# Patient Record
Sex: FEMALE | Race: WHITE | NOT HISPANIC OR LATINO | URBAN - METROPOLITAN AREA
[De-identification: names, ages, dates, MRNs, and addresses within clinical notes are randomized per-mention and may not be internally consistent; named-entity substitution may affect disease eponyms.]

---

## 2024-07-24 ENCOUNTER — OFFICE VISIT (OUTPATIENT)
Dept: DERMATOLOGY | Facility: CLINIC | Age: 69
End: 2024-07-24
Payer: MEDICARE

## 2024-07-24 DIAGNOSIS — B07.0 PLANTAR WART OF LEFT FOOT: Primary | ICD-10-CM

## 2024-07-24 PROCEDURE — 1159F MED LIST DOCD IN RCRD: CPT | Mod: CPTII,S$GLB,, | Performed by: DERMATOLOGY

## 2024-07-24 PROCEDURE — 99203 OFFICE O/P NEW LOW 30 MIN: CPT | Mod: S$GLB,,, | Performed by: DERMATOLOGY

## 2024-07-24 PROCEDURE — 1160F RVW MEDS BY RX/DR IN RCRD: CPT | Mod: CPTII,S$GLB,, | Performed by: DERMATOLOGY

## 2024-07-24 NOTE — PROGRESS NOTES
Subjective:      Patient ID:  Alta Mcmanus is a 69 y.o. female who presents for No chief complaint on file.    HPI  Pt presents today with plantar wart. Pt states lesion have been present for months with no issue. Now when she ambulates the lesions are very painful as she transfers weight across her foot. Has tried no OTC therapies. States she has no warts elsewhere on her body.  Review of Systems    Objective:   Physical Exam   Skin:              Diagram Legend     Erythematous scaling macule/papule c/w actinic keratosis       Vascular papule c/w angioma      Pigmented verrucoid papule/plaque c/w seborrheic keratosis      Yellow umbilicated papule c/w sebaceous hyperplasia      Irregularly shaped tan macule c/w lentigo     1-2 mm smooth white papules consistent with Milia      Movable subcutaneous cyst with punctum c/w epidermal inclusion cyst      Subcutaneous movable cyst c/w pilar cyst      Firm pink to brown papule c/w dermatofibroma      Pedunculated fleshy papule(s) c/w skin tag(s)      Evenly pigmented macule c/w junctional nevus     Mildly variegated pigmented, slightly irregular-bordered macule c/w mildly atypical nevus      Flesh colored to evenly pigmented papule c/w intradermal nevus       Pink pearly papule/plaque c/w basal cell carcinoma      Erythematous hyperkeratotic cursted plaque c/w SCC      Surgical scar with no sign of skin cancer recurrence      Open and closed comedones      Inflammatory papules and pustules      Verrucoid papule consistent consistent with wart     Erythematous eczematous patches and plaques     Dystrophic onycholytic nail with subungual debris c/w onychomycosis     Umbilicated papule    Erythematous-base heme-crusted tan verrucoid plaque consistent with inflamed seborrheic keratosis     Erythematous Silvery Scaling Plaque c/w Psoriasis     See annotation      Assessment / Plan:           Plantar wart of left foot  - OTC mediplast pads by [facility]ad  - apply and leave on  for 1-2 days  - secure with duct tape.  - pt warned that warts will appear irritated. Advised to continue with tx             F/u 2     I have seen the patient, reviewed the Resident's progress note. I have personally interviewed and examined the patient at bedside and agree with the findings.     Confirm pt allergies at next visit.

## 2024-09-23 ENCOUNTER — TELEPHONE (OUTPATIENT)
Dept: DERMATOLOGY | Facility: CLINIC | Age: 69
End: 2024-09-23
Payer: MEDICARE

## 2024-09-24 ENCOUNTER — OFFICE VISIT (OUTPATIENT)
Dept: DERMATOLOGY | Facility: CLINIC | Age: 69
End: 2024-09-24
Payer: MEDICARE

## 2024-09-24 DIAGNOSIS — B07.0 PLANTAR WART OF LEFT FOOT: Primary | ICD-10-CM

## 2024-09-24 PROCEDURE — 17110 DESTRUCTION B9 LES UP TO 14: CPT | Mod: S$GLB,,, | Performed by: DERMATOLOGY

## 2024-09-24 PROCEDURE — 1159F MED LIST DOCD IN RCRD: CPT | Mod: CPTII,S$GLB,, | Performed by: DERMATOLOGY

## 2024-09-24 PROCEDURE — 99213 OFFICE O/P EST LOW 20 MIN: CPT | Mod: 25,GC,S$GLB, | Performed by: DERMATOLOGY

## 2024-09-24 PROCEDURE — 1160F RVW MEDS BY RX/DR IN RCRD: CPT | Mod: CPTII,S$GLB,, | Performed by: DERMATOLOGY

## 2024-09-24 PROCEDURE — 99999 PR PBB SHADOW E&M-EST. PATIENT-LVL I: CPT | Mod: PBBFAC,,, | Performed by: DERMATOLOGY

## 2024-09-24 NOTE — PROGRESS NOTES
Subjective:      Patient ID:  Alta Mcmanus is a 69 y.o. female who presents for   Chief Complaint   Patient presents with    Warts     HPI  69 y.o. F here for follow-up of two plantar warts of the left foot. LOV 7/24/2024, recommended OTC mediplast pads by [facility]ad (contain 40% salicylic acid). Today, patient reports she has been using the wart pads every day for the past 2 months for the wart on the metatarsal pad of her foot. She has not been consistently treating the one on her heel. The one on her metatarsal pad is less tender and have turned more white. The one on her heel has not changed.     Review of Systems   Skin:  Negative for itching and rash.       Objective:   Physical Exam   Constitutional: She appears well-developed and well-nourished. No distress.   Neurological: She is alert and oriented to person, place, and time. She is not disoriented.   Psychiatric: She has a normal mood and affect.   Skin:   Areas Examined (abnormalities noted in diagram):   RLE Inspected  LLE Inspection Performed           Diagram Legend     Erythematous scaling macule/papule c/w actinic keratosis       Vascular papule c/w angioma      Pigmented verrucoid papule/plaque c/w seborrheic keratosis      Yellow umbilicated papule c/w sebaceous hyperplasia      Irregularly shaped tan macule c/w lentigo     1-2 mm smooth white papules consistent with Milia      Movable subcutaneous cyst with punctum c/w epidermal inclusion cyst      Subcutaneous movable cyst c/w pilar cyst      Firm pink to brown papule c/w dermatofibroma      Pedunculated fleshy papule(s) c/w skin tag(s)      Evenly pigmented macule c/w junctional nevus     Mildly variegated pigmented, slightly irregular-bordered macule c/w mildly atypical nevus      Flesh colored to evenly pigmented papule c/w intradermal nevus       Pink pearly papule/plaque c/w basal cell carcinoma      Erythematous hyperkeratotic cursted plaque c/w SCC      Surgical scar with no sign of  skin cancer recurrence      Open and closed comedones      Inflammatory papules and pustules      Verrucoid papule consistent consistent with wart     Erythematous eczematous patches and plaques     Dystrophic onycholytic nail with subungual debris c/w onychomycosis     Umbilicated papule    Erythematous-base heme-crusted tan verrucoid plaque consistent with inflamed seborrheic keratosis     Erythematous Silvery Scaling Plaque c/w Psoriasis     See annotation        Assessment / Plan:        Plantar wart of left foot    Expected modest improvement of plantar wart on left foot metatarsal pad s/p 2 months of OTC wart pads containing 40% salicylic acid. Not as tender as prior. Patient with remaining plantar warts on left heel and new wart on left 4th toe pad.   Plan:  - paring following by cryodestruction performed to the 3 warts as below;   - counseled patient to continue OTC wart pads to all 3 warts  - follow-up with Dr. Browning for FBSE on 11/11 already scheduled    Cryosurgery procedure note:    Verbal consent from the patient is obtained including, but not limited to, risk of hypopigmentation/hyperpigmentation, scar, recurrence of lesion. Liquid nitrogen cryosurgery is applied to 3 verruca with prior paring, as detailed in the physical exam, to produce a freeze injury. 3 consecutive freeze thaw cycles are applied to each verruca. The patient is aware that blisters (possibly blood blisters) may form.         Esther Fuller MD  [facility]atology PGY-III

## 2024-09-24 NOTE — PROGRESS NOTES
I have seen the patient, reviewed the Resident's progress note. I have personally interviewed and examined the patient at bedside and agree with the findings.     Connie Browning MD  [facility]

## 2024-11-12 ENCOUNTER — TELEPHONE (OUTPATIENT)
Dept: DERMATOLOGY | Facility: CLINIC | Age: 69
End: 2024-11-12
Payer: MEDICARE

## 2024-11-13 ENCOUNTER — OFFICE VISIT (OUTPATIENT)
Dept: DERMATOLOGY | Facility: CLINIC | Age: 69
End: 2024-11-13
Payer: MEDICARE

## 2024-11-13 DIAGNOSIS — Z12.83 SKIN CANCER SCREENING: Primary | ICD-10-CM

## 2024-11-13 DIAGNOSIS — L57.0 ACTINIC KERATOSIS: ICD-10-CM

## 2024-11-13 DIAGNOSIS — D18.01 CHERRY ANGIOMA: ICD-10-CM

## 2024-11-13 DIAGNOSIS — B07.0 PLANTAR WART OF LEFT FOOT: ICD-10-CM

## 2024-11-13 DIAGNOSIS — L82.1 SEBORRHEIC KERATOSIS: ICD-10-CM

## 2024-11-13 PROCEDURE — 17000 DESTRUCT PREMALG LESION: CPT | Mod: XS,S$GLB,, | Performed by: DERMATOLOGY

## 2024-11-13 PROCEDURE — 17110 DESTRUCTION B9 LES UP TO 14: CPT | Mod: S$GLB,,, | Performed by: DERMATOLOGY

## 2024-11-13 PROCEDURE — 17003 DESTRUCT PREMALG LES 2-14: CPT | Mod: XS,S$GLB,, | Performed by: DERMATOLOGY

## 2024-11-13 PROCEDURE — 99999 PR PBB SHADOW E&M-EST. PATIENT-LVL I: CPT | Mod: PBBFAC,,, | Performed by: DERMATOLOGY

## 2024-11-13 PROCEDURE — 1160F RVW MEDS BY RX/DR IN RCRD: CPT | Mod: CPTII,S$GLB,, | Performed by: DERMATOLOGY

## 2024-11-13 PROCEDURE — 99213 OFFICE O/P EST LOW 20 MIN: CPT | Mod: 25,S$GLB,, | Performed by: DERMATOLOGY

## 2024-11-13 PROCEDURE — 1101F PT FALLS ASSESS-DOCD LE1/YR: CPT | Mod: CPTII,S$GLB,, | Performed by: DERMATOLOGY

## 2024-11-13 PROCEDURE — 1159F MED LIST DOCD IN RCRD: CPT | Mod: CPTII,S$GLB,, | Performed by: DERMATOLOGY

## 2024-11-13 PROCEDURE — 1126F AMNT PAIN NOTED NONE PRSNT: CPT | Mod: CPTII,S$GLB,, | Performed by: DERMATOLOGY

## 2024-11-13 PROCEDURE — 3288F FALL RISK ASSESSMENT DOCD: CPT | Mod: CPTII,S$GLB,, | Performed by: DERMATOLOGY

## 2024-11-13 NOTE — PROGRESS NOTES
Subjective:      Patient ID:  Alta Mcmanus is a 69 y.o. female who presents for   Chief Complaint   Patient presents with    Skin Check     TBSE     Ms Mcmanus is a 68yo F who presents for wart on left foot and TBSE.    The patient has been seen previously in acute derm clinic for warts on the left foot treated with cryosurgery. Last seen 9/24/24 for cryosurgery to warts on left foot. She has been using mediplast (OTC salicylic acid 40%) pads. They have not gone away.    Pt has no specific spots of concern for skin check today. Denies any growing, changing, tender, bleeding lesions.     No personal history of skin cancer.  No known family history of skin cancer.   History of blistering sun burns as a child.  No history of tanning bed use.        Review of Systems   Constitutional: Negative.    Skin:  Positive for activity-related sunscreen use. Negative for daily sunscreen use (face), recent sunburn and wears hat.   Hematologic/Lymphatic: Does not bruise/bleed easily.       Objective:   Physical Exam   Constitutional: She appears well-developed and well-nourished. No distress.   Neurological: She is alert and oriented to person, place, and time. She is not disoriented.   Psychiatric: She has a normal mood and affect.   Skin:   Areas Examined (abnormalities noted in diagram):   Scalp / Hair Palpated and Inspected  Head / Face Inspection Performed  Neck Inspection Performed  Chest / Axilla Inspection Performed  Abdomen Inspection Performed  Genitals / Buttocks / Groin Inspection Performed  Back Inspection Performed  RUE Inspected  LUE Inspection Performed  RLE Inspected  LLE Inspection Performed  Nails and Digits Inspection Performed                     Diagram Legend     Erythematous scaling macule/papule c/w actinic keratosis       Vascular papule c/w angioma      Pigmented verrucoid papule/plaque c/w seborrheic keratosis      Yellow umbilicated papule c/w sebaceous hyperplasia      Irregularly shaped tan  macule c/w lentigo     1-2 mm smooth white papules consistent with Milia      Movable subcutaneous cyst with punctum c/w epidermal inclusion cyst      Subcutaneous movable cyst c/w pilar cyst      Firm pink to brown papule c/w dermatofibroma      Pedunculated fleshy papule(s) c/w skin tag(s)      Evenly pigmented macule c/w junctional nevus     Mildly variegated pigmented, slightly irregular-bordered macule c/w mildly atypical nevus      Flesh colored to evenly pigmented papule c/w intradermal nevus       Pink pearly papule/plaque c/w basal cell carcinoma      Erythematous hyperkeratotic cursted plaque c/w SCC      Surgical scar with no sign of skin cancer recurrence      Open and closed comedones      Inflammatory papules and pustules      Verrucoid papule consistent consistent with wart     Erythematous eczematous patches and plaques     Dystrophic onycholytic nail with subungual debris c/w onychomycosis     Umbilicated papule    Erythematous-base heme-crusted tan verrucoid plaque consistent with inflamed seborrheic keratosis     Erythematous Silvery Scaling Plaque c/w Psoriasis     See annotation      Assessment / Plan:        Skin cancer screening  - TBSE with findings as above  - counseled on sun protection including regular sunscreen use and sun protective clothing  - given Tutu type and risk factors would recommend annual screenings    Total body skin examination performed today including at least 12 points as noted in physical examination. No lesions suspicious for malignancy noted.    Recommend daily sun protection/avoidance, use of at least SPF 30, broad spectrum sunscreen (OTC drug), skin self examinations, and routine physician surveillance to optimize early detection    Actinic keratosis  - cryosurgery destruction today    Cryosurgery Procedure Note    Verbal consent from the patient is obtained including, but not limited to, risk of hypopigmentation/hyperpigmentation, scar, recurrence of lesion. The  patient is aware of the precancerous quality and need for treatment of these lesions. Liquid nitrogen cryosurgery is applied to the 2 actinic keratoses, as detailed in the physical exam, to produce a freeze injury. The patient is aware that blisters may form and is instructed on wound care with gentle cleansing and use of vaseline ointment to keep moist until healed. The patient is supplied a handout on cryosurgery and is instructed to call if lesions do not completely resolve.    Plantar wart of left foot  Slow improvement s/p cryosurgery x2 treatments and at home salicylic acid pads  - repeat paring with #15 blade and cryosurgery destruction today to wart at left ball of foot. Deferred treatment of wart on heel for now  - continue OTC mediplast salicylic acid 40% pads    Cryosurgery procedure note:    Verbal consent from the patient is obtained including, but not limited to, risk of hypopigmentation/hyperpigmentation, scar, recurrence of lesion. Liquid nitrogen cryosurgery is applied to 1 verruca with prior paring, as detailed in the physical exam, to produce a freeze injury. 2 consecutive freeze thaw cycles are applied to each verruca. The patient is aware that blisters (possibly blood blisters) may form.    Seborrheic keratosis  - counseled on benign nature of lesion, reassurance provided    Cherry angioma  - counseled on benign nature of lesion, reassurance provided         Follow up in about 1 month (around 12/13/2024). for plantar warts    Amanda Davila MD  Dermatology, PGY-3  12/09/2024      I have seen the patient, reviewed the Resident's progress note. I have personally interviewed and examined the patient at bedside and agree with the findings.

## 2025-01-08 ENCOUNTER — OFFICE VISIT (OUTPATIENT)
Dept: DERMATOLOGY | Facility: CLINIC | Age: 70
End: 2025-01-08
Payer: MEDICARE

## 2025-01-08 DIAGNOSIS — B07.0 PLANTAR WART OF LEFT FOOT: Primary | ICD-10-CM

## 2025-01-08 PROCEDURE — 1159F MED LIST DOCD IN RCRD: CPT | Mod: CPTII,S$GLB,, | Performed by: PHYSICIAN ASSISTANT

## 2025-01-08 PROCEDURE — 99999 PR PBB SHADOW E&M-EST. PATIENT-LVL II: CPT | Mod: PBBFAC,,, | Performed by: PHYSICIAN ASSISTANT

## 2025-01-08 PROCEDURE — 3288F FALL RISK ASSESSMENT DOCD: CPT | Mod: CPTII,S$GLB,, | Performed by: PHYSICIAN ASSISTANT

## 2025-01-08 PROCEDURE — 1126F AMNT PAIN NOTED NONE PRSNT: CPT | Mod: CPTII,S$GLB,, | Performed by: PHYSICIAN ASSISTANT

## 2025-01-08 PROCEDURE — 1160F RVW MEDS BY RX/DR IN RCRD: CPT | Mod: CPTII,S$GLB,, | Performed by: PHYSICIAN ASSISTANT

## 2025-01-08 PROCEDURE — 1101F PT FALLS ASSESS-DOCD LE1/YR: CPT | Mod: CPTII,S$GLB,, | Performed by: PHYSICIAN ASSISTANT

## 2025-01-08 PROCEDURE — 99214 OFFICE O/P EST MOD 30 MIN: CPT | Mod: S$GLB,,, | Performed by: PHYSICIAN ASSISTANT

## 2025-01-08 PROCEDURE — G2211 COMPLEX E/M VISIT ADD ON: HCPCS | Mod: S$GLB,,, | Performed by: PHYSICIAN ASSISTANT

## 2025-01-08 RX ORDER — FLUOROURACIL 50 MG/ML
SOLUTION TOPICAL
Qty: 15 ML | Refills: 2 | Status: SHIPPED | OUTPATIENT
Start: 2025-01-08

## 2025-01-08 NOTE — PROGRESS NOTES
Subjective:      Patient ID:  Alta Mcmanus is a 69 y.o. female who presents for   Chief Complaint   Patient presents with    Warts     Left foot     Pt is present for plantar warts.    Patient with new area of concern:   Location: left foot (ball of foot and heel)  Duration: months  Symptoms: Pain of the wart on the ball of foot  Previous treatments: cryo x 2, mediplast (sal acid 40% patches) patches daily         Review of Systems   Skin:  Negative for itching and rash.       Objective:   Physical Exam   Constitutional: She appears well-developed and well-nourished. No distress.   Neurological: She is alert and oriented to person, place, and time. She is not disoriented.   Psychiatric: She has a normal mood and affect.   Skin:   Areas Examined (abnormalities noted in diagram):   LLE Inspection Performed                 Diagram Legend     Erythematous scaling macule/papule c/w actinic keratosis       Vascular papule c/w angioma      Pigmented verrucoid papule/plaque c/w seborrheic keratosis      Yellow umbilicated papule c/w sebaceous hyperplasia      Irregularly shaped tan macule c/w lentigo     1-2 mm smooth white papules consistent with Milia      Movable subcutaneous cyst with punctum c/w epidermal inclusion cyst      Subcutaneous movable cyst c/w pilar cyst      Firm pink to brown papule c/w dermatofibroma      Pedunculated fleshy papule(s) c/w skin tag(s)      Evenly pigmented macule c/w junctional nevus     Mildly variegated pigmented, slightly irregular-bordered macule c/w mildly atypical nevus      Flesh colored to evenly pigmented papule c/w intradermal nevus       Pink pearly papule/plaque c/w basal cell carcinoma      Erythematous hyperkeratotic cursted plaque c/w SCC      Surgical scar with no sign of skin cancer recurrence      Open and closed comedones      Inflammatory papules and pustules      Verrucoid papule consistent consistent with wart     Erythematous eczematous patches and plaques      Dystrophic onycholytic nail with subungual debris c/w onychomycosis     Umbilicated papule    Erythematous-base heme-crusted tan verrucoid plaque consistent with inflamed seborrheic keratosis     Erythematous Silvery Scaling Plaque c/w Psoriasis     See annotation      Assessment / Plan:        Plantar wart of left foot  -     fluorouracil 5% 5 % Soln; Compound with salicyclic acid  (wart film) and apply to affected area qhs as directed  Dispense: 15 mL; Refill: 2    Wart peel instructions and potential side effects reviewed with patient and provided in the AVS.          Follow up in about 6 weeks (around 2/19/2025).

## 2025-01-08 NOTE — PATIENT INSTRUCTIONS
Wartpeel Instructions:  STEP 1: Apply once daily at bedtime. Remove tip cap and apply a small amount of WartPEEL directly onto the plastic applicator (or a toothpick). Use forward and backward pressure on the plunger to control the amount of medication that comes out of the tip of the syringe. Replace tip cap to prevent the medication from drying out.     STEP 2: WartPEEL is a glue not a cream, so make sure it fully dries. After applying a thin and even film on the wart or other lesions being treated, wait until it dries completely (10 to 20 minutes) and a color change from white to translucent white occurs. Test dryness with the blue applicator. WartPEEL should not stick to the applicator and is slightly soft when fully dry. The drying process can be sped up by using a hair dryer on the cool setting or a fan. Be careful not to get the medication onto the healthy skin as this may lead to irritation.     STEP 3: Apply a small piece of the 3M Blenderm tape that came with WartPEEL over the fully dry medication and leave on overnight. Tape is only recommended for use on the hands, feet, elbows, and knees unless otherwise directed by your health care professional.     STEP 4: Wash hands after applying the medication. Never apply to the mouth, eyes, or inside the nose.     STEP 5: In the morning, remove the tape and wash the area to remove excess medication. The area can be left uncovered during the day or a covering such as a band aid may be used.     STEP 6: Repeat steps 1-5, applying and removing the medication once daily for 1-3 weeks or as directed. Make sure to view our video tutorials for more detailed instructions, tips and real life case studies to help make your WartPEEL treatment is successful!     Side Effects:   ? Redness and irritation around the wart(s) are most commonly reported   ? Irritation can be minimized with careful application to the wart alone   ? Irritation can be minimized by allowing the  medication to dry completely before applying tape   ? Though uncommon, burning, crusting, allergic contact dermatitis, itchiness, scarring, rash, soreness, and ulceration can occur.   ? Call your doctor or seek medical attention if you have any side effects that bother you or do not go away.     Contraindications:   ? Do not use if you are pregnant or breastfeeding   ? May cause fetal harm when given to pregnant women   ? Medications present in breast milk may lead to life-threatening or fatal side effects in nursing babies   ? Do not use if you have a severe allergic reaction (anaphylaxis) to WartPEEL or its components including: ? Salicylic acid or derivatives such as aspirin ? 5-fluorouracil (5-FU)

## 2025-02-19 ENCOUNTER — OFFICE VISIT (OUTPATIENT)
Dept: DERMATOLOGY | Facility: CLINIC | Age: 70
End: 2025-02-19
Payer: MEDICARE

## 2025-02-19 DIAGNOSIS — B07.0 PLANTAR WART OF LEFT FOOT: Primary | ICD-10-CM

## 2025-02-19 NOTE — PATIENT INSTRUCTIONS
Plantar wart of left foot    -Debirded hyperkeratotic material with 15 blade today in clinic; patient tolerated well.  -Discussed continuing treatment with wart peel for 1-3 more weeks (until skin feels irritated with application of the medicine or turns bright red)         Follow up in about 4 weeks (around 3/19/2025).    Wartpeel Instructions:  STEP 1: Apply once daily at bedtime. Remove tip cap and apply a small amount of WartPEEL directly onto the plastic applicator (or a toothpick). Use forward and backward pressure on the plunger to control the amount of medication that comes out of the tip of the syringe. Replace tip cap to prevent the medication from drying out.     STEP 2: WartPEEL is a glue not a cream, so make sure it fully dries. After applying a thin and even film on the wart or other lesions being treated, wait until it dries completely (10 to 20 minutes) and a color change from white to translucent white occurs. Test dryness with the blue applicator. WartPEEL should not stick to the applicator and is slightly soft when fully dry. The drying process can be sped up by using a hair dryer on the cool setting or a fan. Be careful not to get the medication onto the healthy skin as this may lead to irritation.     STEP 3: Apply a small piece of the 3M Blenderm tape that came with WartPEEL over the fully dry medication and leave on overnight. Tape is only recommended for use on the hands, feet, elbows, and knees unless otherwise directed by your health care professional.     STEP 4: Wash hands after applying the medication. Never apply to the mouth, eyes, or inside the nose.     STEP 5: In the morning, remove the tape and wash the area to remove excess medication. The area can be left uncovered during the day or a covering such as a band aid may be used.     STEP 6: Repeat steps 1-5, applying and removing the medication once daily for 1-3 weeks or as directed. Make sure to view our video tutorials for more  detailed instructions, tips and real life case studies to help make your WartPEEL treatment is successful!     Side Effects:   ? Redness and irritation around the wart(s) are most commonly reported   ? Irritation can be minimized with careful application to the wart alone   ? Irritation can be minimized by allowing the medication to dry completely before applying tape   ? Though uncommon, burning, crusting, allergic contact dermatitis, itchiness, scarring, rash, soreness, and ulceration can occur.   ? Call your doctor or seek medical attention if you have any side effects that bother you or do not go away.     Contraindications:   ? Do not use if you are pregnant or breastfeeding   ? May cause fetal harm when given to pregnant women   ? Medications present in breast milk may lead to life-threatening or fatal side effects in nursing babies   ? Do not use if you have a severe allergic reaction (anaphylaxis) to WartPEEL or its components including: ? Salicylic acid or derivatives such as aspirin ? 5-fluorouracil (5-FU)

## 2025-02-19 NOTE — PROGRESS NOTES
Subjective:      Patient ID:  Alta Mcmanus is a 69 y.o. female who presents for   Chief Complaint   Patient presents with    Warts     F/u     Pt is present for follow up on warts to left foot. Pt states the wart is  but improving. She reports treating the warts (mainly the one on the ball of her foot) for about three weeks with wart peel. She reports that build up on top of the warts occurred but she was unable to remove it. Does report some associated tenderness.     She reports that she did use some mediplast on the areas recently as well.       Review of Systems   Skin:  Negative for itching and rash.       Objective:   Physical Exam   Constitutional: She appears well-developed and well-nourished. No distress.   Neurological: She is alert and oriented to person, place, and time. She is not disoriented.   Psychiatric: She has a normal mood and affect.   Skin:   Areas Examined (abnormalities noted in diagram):   LLE Inspection Performed           Diagram Legend     Erythematous scaling macule/papule c/w actinic keratosis       Vascular papule c/w angioma      Pigmented verrucoid papule/plaque c/w seborrheic keratosis      Yellow umbilicated papule c/w sebaceous hyperplasia      Irregularly shaped tan macule c/w lentigo     1-2 mm smooth white papules consistent with Milia      Movable subcutaneous cyst with punctum c/w epidermal inclusion cyst      Subcutaneous movable cyst c/w pilar cyst      Firm pink to brown papule c/w dermatofibroma      Pedunculated fleshy papule(s) c/w skin tag(s)      Evenly pigmented macule c/w junctional nevus     Mildly variegated pigmented, slightly irregular-bordered macule c/w mildly atypical nevus      Flesh colored to evenly pigmented papule c/w intradermal nevus       Pink pearly papule/plaque c/w basal cell carcinoma      Erythematous hyperkeratotic cursted plaque c/w SCC      Surgical scar with no sign of skin cancer recurrence      Open and closed comedones       Inflammatory papules and pustules      Verrucoid papule consistent consistent with wart     Erythematous eczematous patches and plaques     Dystrophic onycholytic nail with subungual debris c/w onychomycosis     Umbilicated papule    Erythematous-base heme-crusted tan verrucoid plaque consistent with inflamed seborrheic keratosis     Erythematous Silvery Scaling Plaque c/w Psoriasis     See annotation      Assessment / Plan:        Plantar wart of left foot    -Debirded hyperkeratotic material with 15 blade today in clinic; patient tolerated well.  -Discussed continuing treatment with wart peel for 1-3 more weeks (until skin feels irritated with application of the medicine or turns bright red)         Follow up in about 4 weeks (around 3/19/2025).